# Patient Record
Sex: FEMALE | Race: BLACK OR AFRICAN AMERICAN | NOT HISPANIC OR LATINO | URBAN - METROPOLITAN AREA
[De-identification: names, ages, dates, MRNs, and addresses within clinical notes are randomized per-mention and may not be internally consistent; named-entity substitution may affect disease eponyms.]

---

## 2018-08-14 ENCOUNTER — APPOINTMENT (OUTPATIENT)
Age: 33
Setting detail: DERMATOLOGY
End: 2018-08-15

## 2018-08-14 DIAGNOSIS — L20.89 OTHER ATOPIC DERMATITIS: ICD-10-CM

## 2018-08-14 PROCEDURE — OTHER COUNSELING: OTHER

## 2018-08-14 PROCEDURE — OTHER PRESCRIPTION: OTHER

## 2018-08-14 PROCEDURE — 99202 OFFICE O/P NEW SF 15 MIN: CPT

## 2018-08-14 RX ORDER — FLUOCINONIDE 0.5 MG/ML
OINTMENT TOPICAL
Qty: 1 | Refills: 0 | Status: ERX

## 2018-08-14 ASSESSMENT — LOCATION SIMPLE DESCRIPTION DERM
LOCATION SIMPLE: RIGHT ANTERIOR NECK
LOCATION SIMPLE: LEFT FOREARM
LOCATION SIMPLE: CHEST

## 2018-08-14 ASSESSMENT — LOCATION DETAILED DESCRIPTION DERM
LOCATION DETAILED: RIGHT INFERIOR ANTERIOR NECK
LOCATION DETAILED: UPPER STERNUM
LOCATION DETAILED: LEFT VENTRAL PROXIMAL FOREARM

## 2018-08-14 ASSESSMENT — LOCATION ZONE DERM
LOCATION ZONE: ARM
LOCATION ZONE: NECK
LOCATION ZONE: TRUNK

## 2020-08-07 ENCOUNTER — APPOINTMENT (OUTPATIENT)
Dept: URBAN - METROPOLITAN AREA CLINIC 285 | Age: 35
Setting detail: DERMATOLOGY
End: 2020-08-10

## 2020-08-07 DIAGNOSIS — L20.89 OTHER ATOPIC DERMATITIS: ICD-10-CM

## 2020-08-07 DIAGNOSIS — L29.8 OTHER PRURITUS: ICD-10-CM

## 2020-08-07 PROCEDURE — OTHER PATIENT SPECIFIC COUNSELING: OTHER

## 2020-08-07 PROCEDURE — OTHER TREATMENT REGIMEN: OTHER

## 2020-08-07 PROCEDURE — OTHER PRESCRIPTION: OTHER

## 2020-08-07 PROCEDURE — 99214 OFFICE O/P EST MOD 30 MIN: CPT

## 2020-08-07 PROCEDURE — OTHER COUNSELING: OTHER

## 2020-08-07 RX ORDER — HYDROXYZINE HYDROCHLORIDE 10 MG/1
TABLET, FILM COATED ORAL
Qty: 30 | Refills: 0 | Status: ERX | COMMUNITY
Start: 2020-08-07

## 2020-08-07 RX ORDER — DESONIDE 0.5 MG/G
OINTMENT TOPICAL
Qty: 1 | Refills: 0 | Status: ERX | COMMUNITY
Start: 2020-08-07

## 2020-08-07 ASSESSMENT — LOCATION DETAILED DESCRIPTION DERM
LOCATION DETAILED: RIGHT SUPERIOR HELIX
LOCATION DETAILED: RIGHT VENTRAL DISTAL FOREARM
LOCATION DETAILED: RIGHT VENTRAL PROXIMAL FOREARM
LOCATION DETAILED: LEFT ANTECUBITAL SKIN
LOCATION DETAILED: LEFT SUPERIOR MEDIAL MALAR CHEEK
LOCATION DETAILED: RIGHT SUPERIOR MEDIAL MALAR CHEEK

## 2020-08-07 ASSESSMENT — LOCATION SIMPLE DESCRIPTION DERM
LOCATION SIMPLE: RIGHT CHEEK
LOCATION SIMPLE: LEFT UPPER ARM
LOCATION SIMPLE: RIGHT FOREARM
LOCATION SIMPLE: RIGHT EAR
LOCATION SIMPLE: LEFT CHEEK

## 2020-08-07 ASSESSMENT — LOCATION ZONE DERM
LOCATION ZONE: ARM
LOCATION ZONE: EAR
LOCATION ZONE: FACE

## 2020-08-07 NOTE — HPI: RASH
How Severe Is Your Rash?: moderate
Is This A New Presentation, Or A Follow-Up?: Rash
Additional History: Patient states that she has tried cortisone 10, anti itch cream, and then her sons hydrocortisone ointment but didn’t help. Patient states that she went to urgent care yesterday and was given a steroid shot and prednisone to take.

## 2020-08-07 NOTE — PROCEDURE: TREATMENT REGIMEN
Plan: If needed may use otc Allegra during the day for itching\\n\\nPatient recommended to f/u in [place] in two to three weeks since she will be moving there.
Detail Level: Zone
Initiate Treatment: Hydroxyzine (see AD impression)
Continue Regimen: Prednisone as directed by urgent care doctor
Initiate Treatment: Desonide ointment bid to arms for two weeks then decrease to once a day for one week then d/c. Small amount once daily to under eyes for 1 week then d/c.\\nHydroxyzine 10mg, take one tab once nightly as needed for itching

## 2021-02-02 ENCOUNTER — OFFICE VISIT (OUTPATIENT)
Dept: PRIMARY CARE CLINIC | Age: 36
End: 2021-02-02
Payer: COMMERCIAL

## 2021-02-02 VITALS
DIASTOLIC BLOOD PRESSURE: 82 MMHG | WEIGHT: 112 LBS | HEART RATE: 73 BPM | OXYGEN SATURATION: 100 % | SYSTOLIC BLOOD PRESSURE: 124 MMHG | BODY MASS INDEX: 21.14 KG/M2 | TEMPERATURE: 97.7 F | HEIGHT: 61 IN

## 2021-02-02 DIAGNOSIS — Z13.1 SCREENING FOR DIABETES MELLITUS: ICD-10-CM

## 2021-02-02 DIAGNOSIS — Z13.220 SCREENING FOR LIPID DISORDERS: ICD-10-CM

## 2021-02-02 DIAGNOSIS — Z13.0 SCREENING FOR DEFICIENCY ANEMIA: ICD-10-CM

## 2021-02-02 DIAGNOSIS — R07.9 CHEST PAIN, UNSPECIFIED TYPE: ICD-10-CM

## 2021-02-02 DIAGNOSIS — Z13.29 SCREENING FOR THYROID DISORDER: ICD-10-CM

## 2021-02-02 DIAGNOSIS — R21 RASH AND NONSPECIFIC SKIN ERUPTION: Primary | ICD-10-CM

## 2021-02-02 DIAGNOSIS — R23.9 RECENT SKIN CHANGES: ICD-10-CM

## 2021-02-02 PROCEDURE — 99202 OFFICE O/P NEW SF 15 MIN: CPT | Performed by: NURSE PRACTITIONER

## 2021-02-02 RX ORDER — NYSTATIN 100000 [USP'U]/G
POWDER TOPICAL
Qty: 30 G | Refills: 2 | Status: SHIPPED | OUTPATIENT
Start: 2021-02-02 | End: 2021-05-21 | Stop reason: ALTCHOICE

## 2021-02-02 SDOH — ECONOMIC STABILITY: FOOD INSECURITY: WITHIN THE PAST 12 MONTHS, THE FOOD YOU BOUGHT JUST DIDN'T LAST AND YOU DIDN'T HAVE MONEY TO GET MORE.: NOT ASKED

## 2021-02-02 SDOH — ECONOMIC STABILITY: FOOD INSECURITY: WITHIN THE PAST 12 MONTHS, YOU WORRIED THAT YOUR FOOD WOULD RUN OUT BEFORE YOU GOT MONEY TO BUY MORE.: NOT ASKED

## 2021-02-02 SDOH — ECONOMIC STABILITY: INCOME INSECURITY: HOW HARD IS IT FOR YOU TO PAY FOR THE VERY BASICS LIKE FOOD, HOUSING, MEDICAL CARE, AND HEATING?: NOT ASKED

## 2021-02-02 SDOH — ECONOMIC STABILITY: TRANSPORTATION INSECURITY
IN THE PAST 12 MONTHS, HAS THE LACK OF TRANSPORTATION KEPT YOU FROM MEDICAL APPOINTMENTS OR FROM GETTING MEDICATIONS?: NOT ASKED

## 2021-02-02 ASSESSMENT — ENCOUNTER SYMPTOMS
ABDOMINAL PAIN: 0
DIARRHEA: 0
CONSTIPATION: 0
NAUSEA: 0
BLOOD IN STOOL: 0
SHORTNESS OF BREATH: 0
RECTAL PAIN: 0
ANAL BLEEDING: 0
VOMITING: 0
CHEST TIGHTNESS: 0

## 2021-02-02 ASSESSMENT — PATIENT HEALTH QUESTIONNAIRE - PHQ9
1. LITTLE INTEREST OR PLEASURE IN DOING THINGS: 0
SUM OF ALL RESPONSES TO PHQ QUESTIONS 1-9: 0
SUM OF ALL RESPONSES TO PHQ9 QUESTIONS 1 & 2: 0
2. FEELING DOWN, DEPRESSED OR HOPELESS: 0
1. LITTLE INTEREST OR PLEASURE IN DOING THINGS: 0
SUM OF ALL RESPONSES TO PHQ9 QUESTIONS 1 & 2: 0
SUM OF ALL RESPONSES TO PHQ QUESTIONS 1-9: 0
SUM OF ALL RESPONSES TO PHQ QUESTIONS 1-9: 0

## 2021-02-02 NOTE — LETTER
[facility]  [address] [id number]  Phone: [phone]  Fax: [phone]    Myriam KingstonCONNIE CNP                                                                                   February 2, 2021                       [facility]  [address] [address]      Dear Debi Naidu: Thank you for enrolling in [address] Please follow the instructions below to securely access your online medical record. [employer] allows you to send messages to your doctor, view your test results, renew your prescriptions, schedule appointments, and more. How Do I Sign Up? 1. In your Internet browser, go to [email or web address]  2. Click on the Sign Up Now link in the Sign In box. You will see the New Member Sign Up page. 3. Enter your [employer] Access Code exactly as it appears below. You will not need to use this code after youve completed the sign-up process. If you do not sign up before the expiration date, you must request a new code. [employer] Access Code: [email or web address]  Expires: 3/19/2021  5:49 PM    4. Enter your Social Security Number ([id number]) and Date of Birth (mm/dd/yyyy) as indicated and click Submit. You will be taken to the next sign-up page. 5. Create a [employer] ID. This will be your [employer] login ID and cannot be changed, so think of one that is secure and easy to remember. 6. Create a [employer] password. You can change your password at any time. 7. Enter your Password Reset Question and Answer. This can be used at a later time if you forget your password. 8. Enter your e-mail address. You will receive e-mail notification when new information is available in [address] 9. Click Sign Up. You can now view your medical record. Additional Information  If you have questions, please contact the physician practice where you receive care. Remember, [employer] is NOT to be used for urgent needs. For medical emergencies, dial 911. For questions regarding your [employer] account call [phone]. If you have a clinical question, please call your doctor's office.     Sincerely,  CONNIE Freitas CNP

## 2021-02-02 NOTE — PROGRESS NOTES
[address] [facility]  [address]  [address] [facility] [place] [address]  Dept: [phone]       2021    Shiv Gant (:  1985) eron 37 y.o. female, here to establish care and receive preventative care services. States she relocated here in August. She reports intermittent right chest wall pain, several new skin changes. Chest Pain   This is a new problem. The current episode started 1 to 4 weeks ago. The problem occurs intermittently. The pain is mild. The pain does not radiate. Pertinent negatives include no abdominal pain, dizziness, fever, headaches, malaise/fatigue, nausea, palpitations, shortness of breath or vomiting. The pain is aggravated by nothing. She has tried nothing for the symptoms. reports right sided pain with deep breaths or laughing for 2 weeks. headaches have occurred at the time of chest pain but has also occurred independently and not with every occurrence of chest pain. Does not take any supplements  Exercise: does not exercise regularly    Headache  Reports the headache as stabbing, lasting seconds and resolves without intervention. States the headaches cause her to stop activity, close her eyes until it passes. States she is not aware of any visual disturbance because she closes her eyes with the pain. Does not identify any other symptoms with the headache other then occurring with the chest pain. Works 5 days/week outside the home,  was recruited to [employer].  with 2 children. Health Care Maintenance  Influenza vaccine: declined  Cervical Cancer Screen: UTD     Review of Systems   Constitutional: Negative for activity change, fever and malaise/fatigue. HENT: Negative for congestion. Eyes: Negative for visual disturbance. Respiratory: Negative for chest tightness and shortness of breath. Cardiovascular: Positive for chest pain. Negative for palpitations and leg swelling.    Gastrointestinal: Negative for abdominal pain, anal bleeding, blood in stool, constipation, diarrhea, nausea, rectal pain and vomiting. Endocrine: Negative for polyuria. Genitourinary: Negative for dysuria. Musculoskeletal: Negative for arthralgias and myalgias. Skin: Positive for rash (under right breast). Scab on abdomen  Bump on right chest wall  Scratch behind ear   Neurological: Negative for dizziness, light-headedness and headaches. Psychiatric/Behavioral: Negative for agitation, decreased concentration and sleep disturbance. The patient is not nervous/anxious. Prior to Visit Medications    Medication Sig Taking? Authorizing Provider   nystatin (MYCOSTATIN) [id number] UNIT/GM powder Apply 3 times daily. Yes Erskin Cram, APRN - CNP       Allergies   Allergen Reactions    Penicillins        History reviewed. No pertinent past medical history.     Past Surgical History:   Procedure Laterality Date     SECTION         Social History     Socioeconomic History    Marital status:      Spouse name: Not on file    Number of children: 2    Years of education: Not on file    Highest education level: Not on file   Occupational History    Occupation: Training    Social Needs    Financial resource strain: Not on file    Food insecurity     Worry: Not on file     Inability: Not on file   Daojia needs     Medical: Not on file     Non-medical: Not on file   Tobacco Use    Smoking status: Never Smoker    Smokeless tobacco: Never Used   Substance and Sexual Activity    Alcohol use: Never     Frequency: Never    Drug use: Never    Sexual activity: Yes     Partners: Male     Comment: One male partner   Lifestyle    Physical activity     Days per week: Not on file     Minutes per session: Not on file    Stress: Not on file   Relationships    Social connections     Talks on phone: Not on file     Gets together: Not on file     Attends Buddhism service: Not on file     Active member of club or organization: Not on file     Attends meetings of clubs or organizations: Not on file     Relationship status: Not on file    Intimate partner violence     Fear of current or ex partner: Not on file     Emotionally abused: Not on file     Physically abused: Not on file     Forced sexual activity: Not on file   Other Topics Concern    Not on file   Social History Narrative    Lives with spouse, 2 children and mother        Family History   Problem Relation Age of Onset    Breast Cancer Mother     High Blood Pressure Mother     Schizophrenia Father     Diabetes Father     Other Sister         compromised immune system    Asthma Sister     Cancer Maternal Grandfather         colon    Cancer Paternal Grandmother         Lung    Cancer Maternal Uncle         bone       Vitals:    02/02/21 1643   BP: 124/82   Pulse: 73   Temp: 97.7 °F (36.5 °C)   TempSrc: Temporal   SpO2: 100%   Weight: 112 lb (50.8 kg)   Height: 5' 1\" (1.549 m)     Estimated body mass index is 21.16 kg/m² as calculated from the following:    Height as of this encounter: 5' 1\" (1.549 m). Weight as of this encounter: 112 lb (50.8 kg). Physical Exam  Vitals signs reviewed. Constitutional:       Appearance: She is well-developed. HENT:      Head: Normocephalic. Right Ear: Hearing and external ear normal.      Left Ear: Hearing and external ear normal.      Nose: Nose normal.   Eyes:      General: Lids are normal.   Cardiovascular:      Rate and Rhythm: Normal rate and regular rhythm. Heart sounds: Normal heart sounds, S1 normal and S2 normal.   Pulmonary:      Effort: Pulmonary effort is normal.      Breath sounds: Normal breath sounds. Chest:       Abdominal:       Musculoskeletal: Normal range of motion. Skin:     General: Skin is warm and dry. Findings: No rash. Neurological:      Mental Status: She is alert and oriented to person, place, and time. GCS: GCS eye subscore is 4.  GCS verbal subscore is 5. GCS motor subscore is 6. Psychiatric:         Speech: Speech normal.         Behavior: Behavior normal. Behavior is cooperative. ASSESSMENT/PLAN:  1. Screening for thyroid disorder    - TSH with Reflex; Future    2. Screening for deficiency anemia    - CBC Auto Differential; Future    3. Screening for diabetes mellitus    - Comprehensive Metabolic Panel; Future    4. Screening for lipid disorders    - Lipid Panel; Future    5. Rash and nonspecific skin eruption  - Lupus vs insect bite vs fungal infection  - OFE; Future  - SEDIMENTATION RATE; Future  -Offers no complaints of joint pain  -Apply powder as prescribed  -Will consider Clotrimazole if Nystatin powder ineffective    6. Recent skin changes   Lupus vs Autoimmune disease vs contact dermatitis  -Rashes do not itch.   -Skin changes are recent, changes in color and size in short period of time. - OFE; Future  - SEDIMENTATION RATE; Future  -No family history of skin cancers  -No Dermatologist in [address] [place] [address] are accepting new patients  -Advised to call insurance company for directory of Dermatologist in the area. 7. Chest pain, unspecified type  - Musculoskeletal pain  -No family history of cardiac disease   -Pain is on the right side, oxygen levels within normal limits  -No known history of Covid or exposure      Return in about 3 months (around 5/2/2021). Reviewed patient's pertinent medical history, relevant laboratory results, imaging studies, and health maintenance. Medications have been reviewed and discussed with the patient, refills otherwise up-to-date. Discussed the importance of adhering to current medication regimen. Advised:  (1) continue to work on eating a healthy balanced diet; (2) stay active by exercising within your personal limits.  Patient was advised to keep future appointments with their respective specialty care team(s). Questions and concerns addressed, care plan reviewed and patient is agreeable with the care plan following today's visit.         --CONNIE Concepcion - CNP on 2/3/2021 at 3:03 PM

## 2021-02-08 ENCOUNTER — TELEPHONE (OUTPATIENT)
Dept: PRIMARY CARE CLINIC | Age: 36
End: 2021-02-08

## 2021-02-08 DIAGNOSIS — R21 RASH AND NONSPECIFIC SKIN ERUPTION: ICD-10-CM

## 2021-02-08 DIAGNOSIS — R23.9 RECENT SKIN CHANGES: Primary | ICD-10-CM

## 2021-02-08 NOTE — TELEPHONE ENCOUNTER
----- Message from Farhanmandi Jaqueline sent at 2/8/2021  9:04 AM EST -----  Subject: Referral Request    QUESTIONS   Reason for referral request? Patient is requesting a Referral for   Dermatologist   Has the physician seen you for this condition before? Yes  Select a date? 2021-02-02  Select the physician (PCP or Specialist)? Freya Villa   Preferred Specialist (if applicable)? Do you already have an appointment scheduled? Yes  Select Scheduled Date? 2021-02-10  Select Scheduled Physician? Outside Physician Km Palma  Additional Information for Provider? Fax Number [phone]   ---------------------------------------------------------------------------  --------------  Ronnie Marion INFO  What is the best way for the office to contact you? OK to leave message on   voicemail  Preferred Call Back Phone Number?  [phone]

## 2021-05-20 ENCOUNTER — NURSE TRIAGE (OUTPATIENT)
Dept: OTHER | Facility: CLINIC | Age: 36
End: 2021-05-20

## 2021-05-20 NOTE — PROGRESS NOTES
Co-worker recently had a MVC, syncopal episode and she had chronic headaches. Patient does not want to start any medications, have CT scan, or see Neurology at this time. She wants to continue with life style changes. States she has stopped eating sweets, increased vegetable intake. Works 5 days/week outside the home, states was recruited to [employer].       with 2 children. Dry Mouth  Reports she has dry mouth in the morning. States her mouth feels dry throughout the day. Has not established a dentist since relocating to [place]. States she grinds her teeth, but does not snore. Has changed diet, decreasing sugar and increasing water intake. Denies allergies. Denies oral ulcerations, difficulty swallowing, dental caries. Lab Results   Component Value Date    CHOL 152 02/06/2021     Lab Results   Component Value Date    TRIG 54 02/06/2021     Lab Results   Component Value Date    HDL 56 02/06/2021     Lab Results   Component Value Date    LDLCALC 85 02/06/2021     Lab Results   Component Value Date    LABVLDL 11 02/06/2021     No results found for: [facility]  Lab Results   Component Value Date    WBC 3.2 (L) 02/06/2021    HGB 12.9 02/06/2021    HCT 39.8 02/06/2021    MCV 92.7 02/06/2021     02/06/2021     Lab Results   Component Value Date     02/06/2021    K 3.7 02/06/2021     02/06/2021    CO2 26 02/06/2021    BUN 7 02/06/2021    CREATININE 0.7 02/06/2021    GLUCOSE 81 02/06/2021    CALCIUM 9.7 02/06/2021    PROT 6.4 02/06/2021    LABALBU 4.3 02/06/2021    BILITOT 0.4 02/06/2021    ALKPHOS 41 02/06/2021    AST 20 02/06/2021    ALT 21 02/06/2021    LABGLOM >60 02/06/2021    GFRAA >60 02/06/2021    AGRATIO 2.0 02/06/2021    GLOB 2.1 02/06/2021       Review of Systems   Constitutional: Negative for activity change and fever. HENT: Negative for congestion, postnasal drip, sore throat, trouble swallowing and voice change.          Dry mouth, grinds teeth   Eyes: Negative for mg)  B-Complex supplement  - CT HEAD W WO CONTRAST; Future    2. Dizziness    - CT HEAD W WO CONTRAST; Future    3. Bilateral arm weakness    - CT HEAD W WO CONTRAST; Future    4. Bilateral leg weakness    - CT HEAD W WO CONTRAST; Future    5. Dry mouth  -Schedule dentist appointment for evaluation      Return in about 4 weeks (around 6/18/2021). Reviewed patient's pertinent medical history, relevant laboratory results, imaging studies, and health maintenance. Medications have been reviewed and discussed with the patient, refills otherwise up-to-date. Discussed the importance of adhering to current medication regimen. Advised:  (1) continue to work on eating a healthy balanced diet; (2) stay active by exercising within your personal limits. Patient was advised to keep future appointments with their respective specialty care team(s). Questions and concerns addressed, care plan reviewed and patient is agreeable with the care plan following today's visit. Chelsey Lucero, was evaluated through a synchronous (real-time) audio-video encounter. The patient (or guardian if applicable) is aware that this is a billable service. Verbal consent to proceed has been obtained within the past 12 months. The visit was conducted pursuant to the emergency declaration under the [facility], [address] [place] [address] authority and the [employer] General Act. Patient identification was verified, and a caregiver was present when appropriate. The patient was located in a state where the provider was credentialed to provide care. Total time spent for this encounter: Not billed by time    --CONNIE Ramirez CNP on 5/21/2021 at 11:58 AM    An electronic signature was used to authenticate this note.         --CONNIE Ramirez CNP on 5/21/2021 at 11:56 AM

## 2021-05-20 NOTE — TELEPHONE ENCOUNTER
Reason for Disposition   Unexplained headache that is present > 24 hours    Answer Assessment - Initial Assessment Questions  1. LOCATION: \"Where does it hurt? \"       Behind her eyes and at the back of her head    2. ONSET: \"When did the headache start? \" (Minutes, hours or days)     Headaches about two weeks ago  Dry mouth has been going on for a long time  Dizziness and lightheadedness with some weakness began 5/14/21. Pt reports she needs to lay down to rest and recover from the symptoms    3. PATTERN: \"Does the pain come and go, or has it been constant since it started? \"     Dizziness and headaches are becoming worse when they occur. Lately there hasn't been a time where she has felt 100%      4. SEVERITY: \"How bad is the pain? \" and \"What does it keep you from doing? \"  (e.g., Scale 1-10; mild, moderate, or severe)    - MILD (1-3): doesn't interfere with normal activities     - MODERATE (4-7): interferes with normal activities or awakens from sleep     - SEVERE (8-10): excruciating pain, unable to do any normal activities       Dull pressure more than pain   Nausea goes along with dizziness and lightheadedness and rates that as a six. 5. RECURRENT SYMPTOM: \"Have you ever had headaches before? \" If so, ask: \"When was the last time? \" and \"What happened that time? \"   Dry mouth has been ongoing for a long time. Other symptoms have not occurred together. 6. CAUSE: \"What do you think is causing the headache? \"    Pt reports that she has had some diet changes. 7. MIGRAINE: \"Have you been diagnosed with migraine headaches? \" If so, ask: \"Is this headache similar? \"    not had that diagnosis -- pt doesn't think these are migraines    8. HEAD INJURY: \"Has there been any recent injury to the head? \"     None    9. OTHER SYMPTOMS: \"Do you have any other symptoms? \" (fever, stiff neck, eye pain, sore throat, cold symptoms)  none    10. PREGNANCY: \"Is there any chance you are pregnant? \" \"When was your last menstrual period? \"     no    Protocols used: HEADACHE-ADULT-[place]    Received call from Mitchell Knox at pre-service center [facility]) [place] with Red Flag Complaint. Brief description of triage: headache off and on for 2 weeks, seems to be getting worse; will get dizziness, tired & nausea at times. Triage indicates for patient to be seen by Provider today or tomorrow. Care advice provided, patient verbalizes understanding; denies any other questions or concerns; instructed to call back for any new or worsening symptoms. Writer provided warm transfer to [facility] for appointment scheduling. Attention Provider: Thank you for allowing me to participate in the care of your patient. The patient was connected to triage in response to information provided to the ECC. Please do not respond through this encounter as the response is not directed to a shared pool.

## 2021-05-21 ENCOUNTER — VIRTUAL VISIT (OUTPATIENT)
Dept: PRIMARY CARE CLINIC | Age: 36
End: 2021-05-21
Payer: COMMERCIAL

## 2021-05-21 DIAGNOSIS — R68.2 DRY MOUTH: ICD-10-CM

## 2021-05-21 DIAGNOSIS — R42 DIZZINESS: ICD-10-CM

## 2021-05-21 DIAGNOSIS — R29.898 BILATERAL LEG WEAKNESS: ICD-10-CM

## 2021-05-21 DIAGNOSIS — R29.898 BILATERAL ARM WEAKNESS: ICD-10-CM

## 2021-05-21 DIAGNOSIS — R51.9 NEW ONSET HEADACHE: Primary | ICD-10-CM

## 2021-05-21 PROCEDURE — 1036F TOBACCO NON-USER: CPT | Performed by: NURSE PRACTITIONER

## 2021-05-21 PROCEDURE — G8420 CALC BMI NORM PARAMETERS: HCPCS | Performed by: NURSE PRACTITIONER

## 2021-05-21 PROCEDURE — 99214 OFFICE O/P EST MOD 30 MIN: CPT | Performed by: NURSE PRACTITIONER

## 2021-05-21 PROCEDURE — G8427 DOCREV CUR MEDS BY ELIG CLIN: HCPCS | Performed by: NURSE PRACTITIONER

## 2021-05-21 ASSESSMENT — VISUAL ACUITY: OU: 1

## 2021-05-21 ASSESSMENT — ENCOUNTER SYMPTOMS
EYE DISCHARGE: 0
TROUBLE SWALLOWING: 0
SORE THROAT: 0
EYE REDNESS: 0
SHORTNESS OF BREATH: 0
ABDOMINAL PAIN: 0
NAUSEA: 1
CHEST TIGHTNESS: 0
CONSTIPATION: 0
VOICE CHANGE: 0
DIARRHEA: 0

## 2021-06-28 ENCOUNTER — HOSPITAL ENCOUNTER (OUTPATIENT)
Dept: CT IMAGING | Age: 36
Discharge: HOME | End: 2021-06-28
Payer: COMMERCIAL

## 2021-06-28 DIAGNOSIS — R29.898 BILATERAL LEG WEAKNESS: ICD-10-CM

## 2021-06-28 DIAGNOSIS — R29.898 BILATERAL ARM WEAKNESS: ICD-10-CM

## 2021-06-28 DIAGNOSIS — R42 DIZZINESS: ICD-10-CM

## 2021-06-28 DIAGNOSIS — R51.9 NEW ONSET HEADACHE: ICD-10-CM

## 2021-06-28 PROCEDURE — 6360000004 HC RX CONTRAST MEDICATION: Performed by: NURSE PRACTITIONER

## 2021-06-28 PROCEDURE — 70470 CT HEAD/BRAIN W/O & W/DYE: CPT

## 2021-06-28 RX ADMIN — IOPAMIDOL 80 ML: 755 INJECTION, SOLUTION INTRAVENOUS at 08:15

## 2021-09-17 ENCOUNTER — OFFICE VISIT (OUTPATIENT)
Dept: PRIMARY CARE CLINIC | Age: 36
End: 2021-09-17
Payer: COMMERCIAL

## 2021-09-17 VITALS — OXYGEN SATURATION: 97 % | HEART RATE: 74 BPM | SYSTOLIC BLOOD PRESSURE: 98 MMHG | DIASTOLIC BLOOD PRESSURE: 64 MMHG

## 2021-09-17 DIAGNOSIS — M54.50 ACUTE RIGHT-SIDED LOW BACK PAIN WITHOUT SCIATICA: Primary | ICD-10-CM

## 2021-09-17 LAB
BILIRUBIN, POC: NEGATIVE
BLOOD URINE, POC: NEGATIVE
CLARITY, POC: CLEAR
COLOR, POC: YELLOW
GLUCOSE URINE, POC: NEGATIVE
KETONES, POC: NEGATIVE
LEUKOCYTE EST, POC: NEGATIVE
NITRITE, POC: NEGATIVE
PH, POC: 7.5
PROTEIN, POC: NEGATIVE
SPECIFIC GRAVITY, POC: 1
UROBILINOGEN, POC: 0.2

## 2021-09-17 PROCEDURE — 1036F TOBACCO NON-USER: CPT | Performed by: FAMILY MEDICINE

## 2021-09-17 PROCEDURE — 99212 OFFICE O/P EST SF 10 MIN: CPT | Performed by: FAMILY MEDICINE

## 2021-09-17 PROCEDURE — G8427 DOCREV CUR MEDS BY ELIG CLIN: HCPCS | Performed by: FAMILY MEDICINE

## 2021-09-17 PROCEDURE — G8420 CALC BMI NORM PARAMETERS: HCPCS | Performed by: FAMILY MEDICINE

## 2021-09-17 PROCEDURE — 81002 URINALYSIS NONAUTO W/O SCOPE: CPT | Performed by: FAMILY MEDICINE

## 2021-09-17 RX ORDER — NAPROXEN 500 MG/1
500 TABLET ORAL 2 TIMES DAILY WITH MEALS
Qty: 30 TABLET | Refills: 1 | Status: SHIPPED | OUTPATIENT
Start: 2021-09-17 | End: 2021-10-25

## 2021-09-17 RX ORDER — CYCLOBENZAPRINE HCL 5 MG
5 TABLET ORAL 2 TIMES DAILY PRN
Qty: 20 TABLET | Refills: 1 | Status: SHIPPED | OUTPATIENT
Start: 2021-09-17 | End: 2022-11-03 | Stop reason: SDUPTHER

## 2021-09-17 ASSESSMENT — ENCOUNTER SYMPTOMS
DIARRHEA: 0
ABDOMINAL PAIN: 0
CONSTIPATION: 0
SHORTNESS OF BREATH: 0
BLOOD IN STOOL: 0

## 2021-09-17 NOTE — PROGRESS NOTES
(1.549 m). Weight as of 2/2/21: 112 lb (50.8 kg). Physical Exam  Constitutional:       General: She is not in acute distress. Appearance: She is well-developed. HENT:      Head: Normocephalic. Right Ear: External ear normal.      Nose: Nose normal.   Eyes:      Conjunctiva/sclera: Conjunctivae normal.      Pupils: Pupils are equal, round, and reactive to light. Neck:      Thyroid: No thyromegaly. Cardiovascular:      Rate and Rhythm: Normal rate and regular rhythm. Heart sounds: Normal heart sounds. No murmur heard. No friction rub. Pulmonary:      Effort: Pulmonary effort is normal. No respiratory distress. Breath sounds: Normal breath sounds. No wheezing or rales. Abdominal:      General: Bowel sounds are normal. There is no distension. Palpations: Abdomen is soft. There is no mass. Musculoskeletal:         General: Normal range of motion. Cervical back: Normal range of motion and neck supple. Lymphadenopathy:      Cervical: No cervical adenopathy. Skin:     General: Skin is warm. Findings: No rash. Neurological:      Mental Status: She is alert and oriented to person, place, and time. Psychiatric:         Behavior: Behavior normal.         ASSESSMENT/PLAN:  1. Acute right-sided low back pain without sciatica  Appears to be musculoskeletal in nature. Will prescribe naproxen 500 mg twice daily and Flexeril 5 mg twice daily as needed. Her urinalysis is negative.   If not better in 1 to 2 weeks will order abdominal ultrasound  - POCT Urinalysis no Micro      RTC PRN    An electronic signature was used to authenticate this note.    --Bertrand Lazcano MD on 9/17/2021 at 3:33 PM

## 2021-09-25 NOTE — PROGRESS NOTES
Alexi Moy (:  1985) is a 31 y.o. female,Established patient, here for evaluation of the following chief complaint(s): Other (annual physical)  Patient schedule PAP exam through My Chart, not realizing this Provider no longer preforms PAP's. ASSESSMENT/PLAN:  1. Annual physical exam  Comments:  have labs drawn within 1 week  2. Screening for HIV (human immunodeficiency virus)  -     HIV Screen; Future  3. Need for hepatitis C screening test  -     Hepatitis C Antibody; Future  4. Screening for deficiency anemia  -     CBC Auto Differential; Future  5. Screening for cervical cancer  -     AFL - Sil Pinon MD, Gynecology, [facility]  6. Aching headache        Healthcare maintenance:  Flu vaccine: declined  Covid vaccine: UTD  Varicella Vaccine: reports h/o chicken pox  No follow-ups on file. Subjective   SUBJECTIVE/OBJECTIVE:  HPI  Annual Exam-Premenopausal: Patient presents for annual exam. The patient has no complaints today. The patient is sexually active. The patient wears seatbelts: yes. last pap: was normal  The patient has regular exercise: yes. The patient has ever been transfused or tattooed?: not asked. The patient reports that domestic violence in her life is absent.      Had sharp ice pick like headaches in the past. These headaches are different, and occurs 75 % of the time. Associates leg weakness with headache. Has not taken any medication for relief, \" I don't like taking pills. \" Her aunt has migraines. She reports leg and arm weakness, started 1 week ago, weakness is worse at night, improves as the day progresses. Has stopped walking, to determine if it is caused by exercise, but the symptoms have not improve.    Patient states her spouse and co-workers are concerned about her headaches. Co-worker recently had a MVC, syncopal episode and she had chronic headaches. Patient does not want to start any medications, have CT scan, or see Neurology at this time.  She wants to continue with life style changes. States she has stopped eating sweets, increased vegetable intake. She reports her headaches had improved until the stress at work increased.      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       with 2 children, 10 yo and 4 yo. Has new contact prescription, after eye exam 3 months ago, which has caused dry eyes. Plan to seek care from another Specialist for second opinion.      Dry Mouth  Reports she has dry mouth in the morning. States her mouth feels dry throughout the day. Has not established a dentist since relocating to [place]. States she grinds her teeth, but does not snore. Has changed diet, decreasing sugar and increasing water intake. Denies allergies. Denies oral ulcerations, difficulty swallowing, dental caries.     She has several family members with multiple types of cancer. Interested in genetic testing.      Review of Systems   Constitutional: Negative for activity change and fever. HENT: Negative for congestion, postnasal drip, sore throat, trouble swallowing and voice change. Dry mouth, grinds teeth   Eyes: Negative for discharge, redness and visual disturbance. Respiratory: Negative for chest tightness and shortness of breath. Cardiovascular: Negative for chest pain, palpitations and leg swelling. Gastrointestinal: Negative for abdominal pain, constipation, diarrhea and nausea. Endocrine: Negative for polyuria. Genitourinary: Negative for dysuria. Musculoskeletal: Negative for arthralgias and myalgias. Skin: Negative for rash. Allergic/Immunologic: Negative for environmental allergies. Neurological: Positive for headaches. Negative for dizziness, weakness (legs), light-headedness and numbness. Psychiatric/Behavioral: Negative for agitation, decreased concentration and sleep disturbance. The patient is not nervous/anxious.            Objective    No past medical history on file. Past Surgical History:   Procedure Laterality Date     SECTION       Social History     Tobacco Use    Smoking status: Never Smoker    Smokeless tobacco: Never Used   Vaping Use    Vaping Use: Never used   Substance Use Topics    Alcohol use: Never    Drug use: Never     Family History   Problem Relation Age of Onset    Breast Cancer Mother     High Blood Pressure Mother     Other Father         sleep apnea    Schizophrenia Father     Diabetes Father     Other Sister         compromised immune system    Asthma Sister     Heart Attack Maternal Grandmother     Cancer Maternal Grandfather         colon    Cancer Paternal Grandmother         Lung    No Known Problems Paternal Grandfather     Cancer Maternal Uncle         bone    Cancer Maternal Uncle     Cancer Maternal Uncle     Cancer Paternal Aunt         lung cancer, smoker    height is 5' 1\" (1.549 m) and weight is 120 lb (54.4 kg). Her temporal temperature is 97.2 °F (36.2 °C). Her blood pressure is 109/75 and her pulse is 75. Her oxygen saturation is 98%. Physical Exam  Vitals reviewed. Constitutional:       Appearance: She is well-developed. HENT:      Head: Normocephalic. Right Ear: Hearing and external ear normal.      Left Ear: Hearing and external ear normal.      Nose: Nose normal.   Eyes:      General: Lids are normal.   Cardiovascular:      Rate and Rhythm: Normal rate and regular rhythm. Heart sounds: Normal heart sounds, S1 normal and S2 normal.   Pulmonary:      Effort: Pulmonary effort is normal.      Breath sounds: Normal breath sounds. Musculoskeletal:         General: Normal range of motion. Skin:     General: Skin is warm and dry. Findings: No rash. Neurological:      Mental Status: She is alert and oriented to person, place, and time. GCS: GCS eye subscore is 4. GCS verbal subscore is 5. GCS motor subscore is 6.    Psychiatric:         Speech: Speech normal. Behavior: Behavior normal. Behavior is cooperative. On this date 9/28/2021 I have spent 15 minutes reviewing previous notes, test results and face to face with the patient discussing the diagnosis and importance of compliance with the treatment plan as well as documenting on the day of the visit. Reviewed patient's pertinent medical history, relevant laboratory results, imaging studies, and health maintenance. Medications have been reviewed and discussed with the patient, refills otherwise up-to-date. Discussed the importance of adhering to current medication regimen. Advised:  (1) continue to work on eating a healthy balanced diet; (2) stay active by exercising within your personal limits. Patient was advised to keep future appointments with their respective specialty care team(s). Questions and concerns addressed, care plan reviewed and patient is agreeable with the care plan following today's visit. An electronic signature was used to authenticate this note.     --Jose Bender, APRN - CNP

## 2021-09-28 ENCOUNTER — OFFICE VISIT (OUTPATIENT)
Dept: PRIMARY CARE CLINIC | Age: 36
End: 2021-09-28
Payer: COMMERCIAL

## 2021-09-28 VITALS
TEMPERATURE: 97.2 F | WEIGHT: 120 LBS | HEART RATE: 75 BPM | SYSTOLIC BLOOD PRESSURE: 109 MMHG | OXYGEN SATURATION: 98 % | HEIGHT: 61 IN | DIASTOLIC BLOOD PRESSURE: 75 MMHG | BODY MASS INDEX: 22.66 KG/M2

## 2021-09-28 DIAGNOSIS — Z12.4 SCREENING FOR CERVICAL CANCER: ICD-10-CM

## 2021-09-28 DIAGNOSIS — Z00.00 ANNUAL PHYSICAL EXAM: Primary | ICD-10-CM

## 2021-09-28 DIAGNOSIS — Z13.0 SCREENING FOR DEFICIENCY ANEMIA: ICD-10-CM

## 2021-09-28 DIAGNOSIS — R51.9 ACHING HEADACHE: ICD-10-CM

## 2021-09-28 DIAGNOSIS — Z11.59 NEED FOR HEPATITIS C SCREENING TEST: ICD-10-CM

## 2021-09-28 DIAGNOSIS — Z11.4 SCREENING FOR HIV (HUMAN IMMUNODEFICIENCY VIRUS): ICD-10-CM

## 2021-09-28 PROCEDURE — 99395 PREV VISIT EST AGE 18-39: CPT | Performed by: NURSE PRACTITIONER

## 2021-09-28 ASSESSMENT — ENCOUNTER SYMPTOMS
ABDOMINAL PAIN: 0
TROUBLE SWALLOWING: 0
DIARRHEA: 0
CHEST TIGHTNESS: 0
SORE THROAT: 0
CONSTIPATION: 0
EYE DISCHARGE: 0
VOICE CHANGE: 0
SHORTNESS OF BREATH: 0
EYE REDNESS: 0
NAUSEA: 0

## 2021-10-09 DIAGNOSIS — Z80.9 FAMILY HISTORY OF CANCER: Primary | ICD-10-CM

## 2021-10-19 LAB
HPV COMMENT: NORMAL
HPV TYPE 16: NOT DETECTED
HPV TYPE 18: NOT DETECTED
HPVOH (OTHER TYPES): NOT DETECTED

## 2021-10-25 ENCOUNTER — TELEPHONE (OUTPATIENT)
Dept: PRIMARY CARE CLINIC | Age: 36
End: 2021-10-25

## 2021-10-25 ENCOUNTER — VIRTUAL VISIT (OUTPATIENT)
Dept: PRIMARY CARE CLINIC | Age: 36
End: 2021-10-25
Payer: COMMERCIAL

## 2021-10-25 DIAGNOSIS — R05.9 COUGH: Primary | ICD-10-CM

## 2021-10-25 DIAGNOSIS — R51.9 ACHING HEADACHE: ICD-10-CM

## 2021-10-25 DIAGNOSIS — R51.9 ACHING HEADACHE: Primary | ICD-10-CM

## 2021-10-25 PROCEDURE — G8427 DOCREV CUR MEDS BY ELIG CLIN: HCPCS | Performed by: NURSE PRACTITIONER

## 2021-10-25 PROCEDURE — 99213 OFFICE O/P EST LOW 20 MIN: CPT | Performed by: NURSE PRACTITIONER

## 2021-10-25 RX ORDER — FLUTICASONE PROPIONATE 50 MCG
1 SPRAY, SUSPENSION (ML) NASAL DAILY
Qty: 16 G | Refills: 2 | Status: SHIPPED | OUTPATIENT
Start: 2021-10-25 | End: 2022-10-15

## 2021-10-25 RX ORDER — GUAIFENESIN 600 MG/1
600 TABLET, EXTENDED RELEASE ORAL 2 TIMES DAILY
Qty: 30 TABLET | Refills: 0 | Status: SHIPPED | OUTPATIENT
Start: 2021-10-25 | End: 2021-11-09

## 2021-10-25 RX ORDER — ECHINACEA PURPUREA EXTRACT 125 MG
1 TABLET ORAL PRN
Qty: 1 EACH | Refills: 3 | Status: SHIPPED | OUTPATIENT
Start: 2021-10-25 | End: 2022-10-15

## 2021-10-25 ASSESSMENT — ENCOUNTER SYMPTOMS
VOMITING: 0
SHORTNESS OF BREATH: 0
SINUS PAIN: 1
SORE THROAT: 1
DIARRHEA: 0
CHEST TIGHTNESS: 0
RHINORRHEA: 1
COUGH: 1
NAUSEA: 1

## 2021-10-25 NOTE — PROGRESS NOTES
10/25/2021    TELEHEALTH EVALUATION -- Audio/Visual (During CMGLT-85 public health emergency)    HPI:    Margie Smith (:  1985) has requested an audio/video evaluation for the following concern(s):    Patient presents via virtual visit today for cough, sinus pain and postnasal drainage. Symptoms began approximately 3 days ago. She complains of right facial pain and sore throat. She denies any trouble swallowing. Eating and drinking does worsen the pain in her throat. She complains of cough that is productive of yellow sputum, sometimes with streaks of blood. Denies any sourav red blood, lots. She has postnasal drainage that she feels is triggering her cough is the cough is intermittent and typically only when she feels a tickle in the back of her throat. She denies fever however notes she has had some chills. She denies shortness of breath. Denies chest pain. Denies changes to her taste and smell. She did have a brief episode of nausea yesterday without vomiting, this is resolved. Denies diarrhea. Denies chest congestion. Has been eating and drinking normally. She has tried tea without relief. Has not tried any over-the-counter remedies aside from this. Denies sick contacts, however notes many coworkers are out ill - she is unsure if she had contact with them. Has received both doses COVID vaccine. She did have COVID testing completed earlier today, results pending. Review of Systems   Constitutional: Positive for chills. Negative for appetite change and fever. HENT: Positive for postnasal drip, rhinorrhea, sinus pain and sore throat. Respiratory: Positive for cough. Negative for chest tightness and shortness of breath. Cardiovascular: Negative for chest pain. Gastrointestinal: Positive for nausea. Negative for diarrhea and vomiting. Psychiatric/Behavioral: Negative. Prior to Visit Medications    Medication Sig Taking?  Authorizing Provider   fluticasone (FLONASE) 50 MCG/ACT nasal spray 1 spray by Each Nostril route daily Yes CONNIE West   guaiFENesin (MUCINEX) 600 MG extended release tablet Take 1 tablet by mouth 2 times daily for 15 days Yes CONNIE West   sodium chloride (OCEAN) 0.65 % nasal spray 1 spray by Nasal route as needed for Congestion (nasal dryness) Yes CONNIE West   Multiple Vitamin (MULTIVITAMIN PO) Take by mouth  Historical Provider, MD       Social History     Tobacco Use    Smoking status: Never Smoker    Smokeless tobacco: Never Used   Vaping Use    Vaping Use: Never used   Substance Use Topics    Alcohol use: Never    Drug use: Never        Allergies   Allergen Reactions    Penicillins    , No past medical history on file. PHYSICAL EXAMINATION:  [ INSTRUCTIONS:  \"[x]\" Indicates a positive item  \"[]\" Indicates a negative item  -- DELETE ALL ITEMS NOT EXAMINED]  Vital Signs: (As obtained by patient/caregiver or practitioner observation)    Constitutional: [x] Appears well-developed and well-nourished [] No apparent distress      [] Abnormal-   Mental status  [x] Alert and awake  [x] Oriented to person/place/time [x]Able to follow commands      Eyes:  EOM    []  Normal  [] Abnormal-  Sclera  []  Normal  [] Abnormal -         Discharge []  None visible  [] Abnormal -    HENT:   [x] Normocephalic, atraumatic. [] Abnormal   [] Mouth/Throat: Mucous membranes are moist.     External Ears [x] Normal  [] Abnormal-     Neck: [x] No visualized mass     Pulmonary/Chest: [x] Respiratory effort normal.  [x] No visualized signs of difficulty breathing or respiratory distress        [] Abnormal- Speaking in complete sentences without difficulty. No cough noted during visit.       Musculoskeletal:   [] Normal gait with no signs of ataxia         [x] Normal range of motion of neck        [] Abnormal-       Neurological:        [x] No Facial Asymmetry (Cranial nerve 7 motor function) (limited exam to video visit)          [] No gaze palsy        [] Abnormal-         Skin:        [] No significant exanthematous lesions or discoloration noted on facial skin         [] Abnormal-            Psychiatric:       [x] Normal Affect [] No Hallucinations        [] Abnormal-     Other pertinent observable physical exam findings-     ASSESSMENT/PLAN:  1. Cough: with post nasal drainage and sinus pain. Sinusitis v COVID-19.  - COVID testing pending. Isolation precautions advised. - Day 3 of symptoms, if no improvement by day 6-7, patient to call  - Flonase, saline spray and mucinex  - Supportive measures advised  - fluticasone (FLONASE) 50 MCG/ACT nasal spray; 1 spray by Each Nostril route daily  Dispense: 16 g; Refill: 2  - guaiFENesin (MUCINEX) 600 MG extended release tablet; Take 1 tablet by mouth 2 times daily for 15 days  Dispense: 30 tablet; Refill: 0  - sodium chloride (OCEAN) 0.65 % nasal spray; 1 spray by Nasal route as needed for Congestion (nasal dryness)  Dispense: 1 each; Refill: 3      No follow-ups on file. Sowmya Nuñez, was evaluated through a synchronous (real-time) audio-video encounter. The patient (or guardian if applicable) is aware that this is a billable service. Verbal consent to proceed has been obtained within the past 12 months. The visit was conducted pursuant to the emergency declaration under the [address] [place] [address], [address] [place] [address] authority and the [employer] and [employer] General Act. Patient identification was verified, and a caregiver was present when appropriate. The patient was located in a state where the provider was credentialed to provide care. Total time spent on this encounter: Not billed by time    --CONNIE Palmer on 10/25/2021 at 3:54 PM    An electronic signature was used to authenticate this note.

## 2021-10-25 NOTE — PATIENT INSTRUCTIONS
Flonase, saline nasal spray and mucinex  May take ibuprofen/Tylenol as needed  Drink plenty of water  Isolate from others until we get COVID testing result  Follow up pending your test result

## 2021-10-25 NOTE — TELEPHONE ENCOUNTER
Patient would like to have a prescription to get a COVID 19 test.  She has several of the symptom and needs the order by 12 to have it done.   Please advise

## 2021-10-26 LAB — SARS-COV-2: NOT DETECTED

## 2022-10-14 ENCOUNTER — NURSE TRIAGE (OUTPATIENT)
Dept: OTHER | Facility: CLINIC | Age: 37
End: 2022-10-14

## 2022-10-14 NOTE — TELEPHONE ENCOUNTER
Location of patient: [address] call from Geraldo Witt at [facility] with [employer]. Current Symptoms: dizziness, headache - back of head    Intermittent tightness in back    Diarrhea yesterday x 3 - none today    Speech is clear, speaking in complete sentences    Reports BP as 106/74 HR 88 today at 5:40am      Onset: 2 days ago;     Pain Severity: 4/10;  intermittent    Temperature: denies     What has been tried: salt in water, body armour water, B complex,     Denies - numbness or weakness on one side of the body / fainting / heart palpitations / double vision / loss of vision / shortness of breath /  bloody black or tarry stool    LMP:  \"last week or two weeks ago\"  Pregnant: No    Recommended disposition: See PCP within 24 Hours    Care advice provided, patient verbalizes understanding; denies any other questions or concerns; instructed to call back for any new or worsening symptoms. Patient/Caller agrees with recommended disposition; writer provided warm transfer to Maria M Stock at [facility] for appointment scheduling    Attention Provider: Thank you for allowing me to participate in the care of your patient. The patient was connected to triage in response to information provided to the ECC/PSC. Please do not respond through this encounter as the response is not directed to a shared pool.         Reason for Disposition   [1] MODERATE dizziness (e.g., interferes with normal activities) AND [2] has NOT been evaluated by physician for this  (Exception: dizziness caused by heat exposure, sudden standing, or poor fluid intake)    Protocols used: Dizziness - Lightheadedness-ADULT-

## 2022-10-15 ENCOUNTER — TELEMEDICINE (OUTPATIENT)
Dept: PRIMARY CARE CLINIC | Age: 37
End: 2022-10-15
Payer: COMMERCIAL

## 2022-10-15 DIAGNOSIS — R00.0 TACHYCARDIA: Primary | ICD-10-CM

## 2022-10-15 DIAGNOSIS — R03.1 BLOOD PRESSURE DECREASED: ICD-10-CM

## 2022-10-15 DIAGNOSIS — R00.0 TACHYCARDIA: ICD-10-CM

## 2022-10-15 LAB
A/G RATIO: 1.7 (ref 1.1–2.2)
ALBUMIN SERPL-MCNC: 4.3 G/DL (ref 3.4–5)
ALP BLD-CCNC: 54 U/L (ref 40–129)
ALT SERPL-CCNC: 25 U/L (ref 10–40)
ANION GAP SERPL CALCULATED.3IONS-SCNC: 8 MMOL/L (ref 3–16)
AST SERPL-CCNC: 22 U/L (ref 15–37)
BILIRUB SERPL-MCNC: <0.2 MG/DL (ref 0–1)
BUN BLDV-MCNC: 7 MG/DL (ref 7–20)
CALCIUM SERPL-MCNC: 9.8 MG/DL (ref 8.3–10.6)
CHLORIDE BLD-SCNC: 103 MMOL/L (ref 99–110)
CO2: 28 MMOL/L (ref 21–32)
CREAT SERPL-MCNC: 0.6 MG/DL (ref 0.6–1.1)
GFR AFRICAN AMERICAN: >60
GFR NON-AFRICAN AMERICAN: >60
GLUCOSE BLD-MCNC: 83 MG/DL (ref 70–99)
MAGNESIUM: 2.3 MG/DL (ref 1.8–2.4)
POTASSIUM SERPL-SCNC: 4.9 MMOL/L (ref 3.5–5.1)
SODIUM BLD-SCNC: 139 MMOL/L (ref 136–145)
TOTAL PROTEIN: 6.8 G/DL (ref 6.4–8.2)

## 2022-10-15 PROCEDURE — G8427 DOCREV CUR MEDS BY ELIG CLIN: HCPCS | Performed by: NURSE PRACTITIONER

## 2022-10-15 PROCEDURE — 99213 OFFICE O/P EST LOW 20 MIN: CPT | Performed by: NURSE PRACTITIONER

## 2022-10-15 RX ORDER — MAGNESIUM GLUCONATE 27 MG(500)
250 TABLET ORAL DAILY
COMMUNITY

## 2022-10-15 RX ORDER — VITAMIN B COMPLEX
1 CAPSULE ORAL DAILY
COMMUNITY

## 2022-10-15 ASSESSMENT — ENCOUNTER SYMPTOMS: DIARRHEA: 1

## 2022-10-15 NOTE — PROGRESS NOTES
Meghna Barroso (:  1985) is a Established patient, here for evaluation of the following:    ASSESSMENT/PLAN:  Below is the assessment and plan developed based on review of pertinent history, physical exam, labs, studies, and medications. 1. Tachycardia  -     Comprehensive Metabolic Panel; Future  -     Magnesium; Future  -     EKG 12 Lead  2. Blood pressure decreased  -     [address], Cardiology, [address]  No follow-ups on file. SUBJECTIVE/OBJECTIVE:  This is a 30year old patient of Rosendo Lemus, consenting for VV today. She has had dizziness and headaches. She was concerned. Her blood pressure has been lower than normal and HR increased. She got dizzy and ate something. She had right shoulder and back discomfort. Last night she felt so poorly that she almost went to . She took magnesium and felt better, BP was low. Today she didn't feel heaviness in arms and legs. Today her BP was 97/67. Her heart rate was 105 at 0709. Her heart rate has fluctuated . She could feel heart racing at times. She added salt to her water. She has added mag and b complex, as directed by her PCP. She drinks at least 64 ounces daily. She does not feel her symptoms are positional. She gets headache intermittently. She had random bouts of diarrhea one day. Here are some recorded BP and HR  96/62  82 10/12  101-61 68 10/13 am  113/74 61  10/14 am  130/93  103  10/14  pm  Recommend she drink sugar free power petros with electrolytes and keep water intake up. Will order labs and EKG. Referral to cardiology due to symptoms. Advised to go to [facility] or ED if she has discomfort or episode similar to previous ones. Will notify her of test results. Review of Systems   Cardiovascular:         Chest tightness   Gastrointestinal:  Positive for diarrhea. Neurological:  Positive for dizziness, weakness and light-headedness.      Patient-Reported Vitals 10/15/2022   Patient-Reported Weight 114 Patient-Reported Height 5\" 0'   Patient-Reported Systolic -   Patient-Reported Diastolic -   Patient-Reported Temperature -   Patient-Reported SpO2 -         Physical Exam    [INSTRUCTIONS:  \"[x]\" Indicates a positive item  \"[]\" Indicates a negative item  -- DELETE ALL ITEMS NOT EXAMINED]    Constitutional: [x] Appears well-developed and well-nourished [x] No apparent distress      [] Abnormal -     Mental status: [x] Alert and awake  [x] Oriented to person/place/time [x] Able to follow commands    [] Abnormal -     Eyes:   EOM    [x]  Normal    [] Abnormal -   Sclera  [x]  Normal    [] Abnormal -          Discharge [x]  None visible   [] Abnormal -     HENT: [x] Normocephalic, atraumatic  [] Abnormal -   [x] Mouth/Throat: Mucous membranes are moist    External Ears [x] Normal  [] Abnormal -    Neck: [x] No visualized mass [] Abnormal -     Pulmonary/Chest: [x] Respiratory effort normal   [x] No visualized signs of difficulty breathing or respiratory distress        [] Abnormal -      Musculoskeletal:   [x] Normal gait with no signs of ataxia         [x] Normal range of motion of neck        [] Abnormal -     Neurological:        [x] No Facial Asymmetry (Cranial nerve 7 motor function) (limited exam due to video visit)          [x] No gaze palsy        [] Abnormal -          Skin:        [x] No significant exanthematous lesions or discoloration noted on facial skin         [] Abnormal -            Psychiatric:       [x] Normal Affect [] Abnormal -       Other pertinent observable physical exam findings:-      On this date 10/15/2022 I have spent 25 minutes reviewing previous notes, test results and face to face (virtual) with the patient discussing the diagnosis and importance of compliance with the treatment plan as well as documenting on the day of the visit. Nelida Pavon, was evaluated through a synchronous (real-time) audio-video encounter.  The patient (or guardian if applicable) is aware that this is a billable service, which includes applicable co-pays. This Virtual Visit was conducted with patient's (and/or legal guardian's) consent. The visit was conducted pursuant to the emergency declaration under the [address] [place] [address] authority and the [employer] and [employer] General Act. Patient identification was verified, and a caregiver was present when appropriate. The patient was located at home in a state where the provider was licensed to provide care.     --CONNIE Tapia

## 2022-10-15 NOTE — PROGRESS NOTES
[facility] Locations - No appointment necessary. @ indicates the location is open Saturdays in addition to Monday through Friday. Call your preferred location for test preparation, business hours and other information you need. [employer] accepts [employer]. [facility]    @ [place] [facility] [address] [facility] [address] [place] [address]. [address] [facility], [address]   Ph: [phone] [place] [facility] [address]   Ph: [phone]  @ [facility] [address] [facility]   Ph: [phone]    [facility] [address]   Ph: [phone] @ [place] [facility]. [address]  Ph: [phone] @ [facility] S[facility]. [address] [place] [address]. [address] [place] [address] [address]   Ph: [phone]     [facility] Svcs. [address], [address]  Ph: [phone]    [facility]   @ [place] [facility]. [address] [facility] [address] [place], [place] [address]   Ph: [phone] [facility] [facility], [address] [facility]  Ph: [address] [place] [address]   [facility]:  [address] Lab Svcs[address] [facility]   Ph: [phone] [facility]. Lab Svcs.   [address] [facility], [address] [facility]   Ph: [phone]

## 2022-10-17 ENCOUNTER — HOSPITAL ENCOUNTER (OUTPATIENT)
Age: 37
Discharge: HOME | End: 2022-10-17
Payer: COMMERCIAL

## 2022-10-17 PROCEDURE — 93005 ELECTROCARDIOGRAM TRACING: CPT | Performed by: NURSE PRACTITIONER

## 2022-10-19 LAB
EKG ATRIAL RATE: 66 BPM
EKG DIAGNOSIS: NORMAL
EKG P AXIS: 52 DEGREES
EKG P-R INTERVAL: 176 MS
EKG Q-T INTERVAL: 390 MS
EKG QRS DURATION: 78 MS
EKG QTC CALCULATION (BAZETT): 408 MS
EKG R AXIS: 41 DEGREES
EKG T AXIS: 38 DEGREES
EKG VENTRICULAR RATE: 66 BPM

## 2022-10-19 PROCEDURE — 93010 ELECTROCARDIOGRAM REPORT: CPT | Performed by: INTERNAL MEDICINE

## 2022-10-19 NOTE — PROGRESS NOTES
[address]  10/25/22  Referring:  Filomena ROSALES- CNP    REASON FOR CONSULT/CHIEF COMPLAINT/HPI     Reason for visit/ Chief complaint   Blood pressure decreased    HPI Ashtyn Velazquez is a 30 y. o.female referred by her CNP Gómez for low blood pressure and tachycardia. She c/o dizziness, heart racing  and headaches. She denied her symptoms being positional. Her headaches are intermittent. Her blood pressure has been running low and she has been tachycardic. (97/67 ) Her heart rate fluctuates between . She stated she drinks 64 ounces of fluid daily. Per her PCP's direction she is adding salt to her water and taking mag and b complex. Today she is here with her . 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 She has also been having intermittent chest pain. She drinks a gallon to 1/3 gallon of water a day. She does circuits, she used to be in the [employer] and she is trying to get back to her previous weight. She is very healthy with her diet and working out. She has sporadic chest pain, last Tuesday she had a lot of pressure and she went to the ER. She denies leg swelling, she has numbness and tingling in her legs. She only has SOB while lying down if she has experienced chest pain. Aunt, mom- HTN, grandmother, a lot of heart attacks and strokes run in the family. She is a non smoker, denies being a diabetic. She has been having heavy periods. She had pre eclampsia with her daughter, she has 2 children. She didn't have any heart failure type of symptoms during her 2 pregnancies. Patient is adherent with medications and is tolerating them well without side effects     HISTORY/ALLERGIES/ROS     MedHx:  has no past medical history on file. SurgHx:  has a past surgical history that includes  section. SocHx:  reports that she has never smoked.  She has never used smokeless tobacco. She reports that she does not drink alcohol and does not use drugs. FamHx: Premature CAD and strokes run in the family  Allergies: Penicillins     MEDICATIONS      Prior to Admission medications    Medication Sig Start Date End Date Taking? Authorizing Provider   b complex vitamins capsule Take 1 capsule by mouth daily   Yes Historical Provider, MD   magnesium gluconate (MAGONATE) 500 MG tablet Take 250 mg by mouth daily   Yes Historical Provider, MD   Multiple Vitamin (MULTIVITAMIN PO) Take by mouth   Yes Historical Provider, MD       PHYSICAL EXAM        Vitals:    10/25/22 1035   BP: 100/60   Pulse: (!) 105   SpO2: 97%    Weight: 116 lb 4.8 oz (52.8 kg)     Gen Alert, cooperative, no distress Heart  Regular rate and rhythm, no murmur   Head Normocephalic, atraumatic, no abnormalities Abd  Soft, NT, +BS, no mass, no OM   Eyes PERRLA, conj/corn clear Ext  Ext nl, AT, no C/C, no edema   Nose Nares normal, no drain age, Non-tender Pulse 2+ and symmetric   Throat Lips, mucosa, tongue normal Skin Color/text/turg nl, no rash/lesions   Neck S/S, TM, NT, no bruit Psych Nl mood and affect   Lung CTA-B, unlabored, no DTP     Ch wall NT, no deform       LABS and Imaging     Relevant and available CV data reviewed    EKG personally interpreted: 10/17/22 NSR    2/6/21 Lipid Panel  TG 54 HDL 56 LDL 85  TSH 2021 wnl  CBC 2021 wnl      ModerateRisk  ModerateComplexity/Medical Decision Making    Outside/Care everywhere records Reviewed  Labs Reviewed  Prior Imaging, ekg, cath, echo reviewed when available  Medications reviewed  Old Notes reviewed  ASSESSMENT AND PLAN     Chest pain of uncertain etiology / dyspnea on exertion  Stress echocardiogram  Tachycardia/dizziness/hypotension  7 day Holter monitor  Labwork to evaluate for anemia from heavy periods, thyroid, UA to check spec gravity, CMP    Patient counseled on lifestyle modification, diet, and exercise.     Follow Up: As needed pending results of above    Sara Royal Gerald Benjamin MD  Cardiologist [facility]    Scribe's Attestation: This note was scribed in the presence of Dr. Gerson Kemp MD by Barbara Perez RN. 10/25/22     Physician Attestation  The scribe wrote this note in the presence of me Ashley Sierra MD). The scribe may have prepopulated components of this note with my historical  intellectual property under my direct supervision. Any additions to this intellectual property were performed in my presence and at my direction. Furthermore, the content and accuracy of this note have been reviewed by me with edits by me as needed.   Ashley Sierra MD 10/25/2022 11:25 AM

## 2022-10-20 NOTE — RESULT ENCOUNTER NOTE
Please inform patient her EKG showed normal sinus rhythm. Follow up for in person evaluation with pcp and cardiology as referred.  TY

## 2022-10-24 PROBLEM — R00.0 TACHYCARDIA: Status: ACTIVE | Noted: 2022-10-24

## 2022-10-24 PROBLEM — R03.1: Status: ACTIVE | Noted: 2022-10-24

## 2022-10-25 ENCOUNTER — OFFICE VISIT (OUTPATIENT)
Dept: PRIMARY CARE CLINIC | Age: 37
End: 2022-10-25
Payer: COMMERCIAL

## 2022-10-25 ENCOUNTER — HOSPITAL ENCOUNTER (OUTPATIENT)
Age: 37
Discharge: HOME | End: 2022-10-25
Payer: COMMERCIAL

## 2022-10-25 ENCOUNTER — OFFICE VISIT (OUTPATIENT)
Dept: CARDIOLOGY CLINIC | Age: 37
End: 2022-10-25
Payer: COMMERCIAL

## 2022-10-25 VITALS
TEMPERATURE: 98.4 F | HEIGHT: 61 IN | SYSTOLIC BLOOD PRESSURE: 108 MMHG | OXYGEN SATURATION: 100 % | HEART RATE: 86 BPM | DIASTOLIC BLOOD PRESSURE: 76 MMHG | WEIGHT: 117 LBS | BODY MASS INDEX: 22.09 KG/M2

## 2022-10-25 VITALS
WEIGHT: 116.3 LBS | SYSTOLIC BLOOD PRESSURE: 100 MMHG | HEIGHT: 61 IN | OXYGEN SATURATION: 97 % | HEART RATE: 105 BPM | BODY MASS INDEX: 21.96 KG/M2 | DIASTOLIC BLOOD PRESSURE: 60 MMHG

## 2022-10-25 DIAGNOSIS — R00.0 TACHYCARDIA: ICD-10-CM

## 2022-10-25 DIAGNOSIS — R03.1 DECREASED BLOOD PRESSURE, NOT HYPOTENSION: ICD-10-CM

## 2022-10-25 DIAGNOSIS — R00.2 PALPITATIONS: ICD-10-CM

## 2022-10-25 DIAGNOSIS — R07.9 CHEST PAIN, UNSPECIFIED TYPE: ICD-10-CM

## 2022-10-25 DIAGNOSIS — R07.9 CHEST PAIN OF UNCERTAIN ETIOLOGY: ICD-10-CM

## 2022-10-25 DIAGNOSIS — R00.0 TACHYCARDIA: Primary | ICD-10-CM

## 2022-10-25 DIAGNOSIS — R03.1 DECREASED BLOOD PRESSURE, NOT HYPOTENSION: Primary | ICD-10-CM

## 2022-10-25 LAB
A/G RATIO: 1.9 (ref 1.1–2.2)
ALBUMIN SERPL-MCNC: 4.8 G/DL (ref 3.4–5)
ALP BLD-CCNC: 55 U/L (ref 40–129)
ALT SERPL-CCNC: 22 U/L (ref 10–40)
ANION GAP SERPL CALCULATED.3IONS-SCNC: 12 MMOL/L (ref 3–16)
AST SERPL-CCNC: 19 U/L (ref 15–37)
BACTERIA: NORMAL /HPF
BILIRUB SERPL-MCNC: <0.2 MG/DL (ref 0–1)
BILIRUBIN URINE: NEGATIVE
BLOOD, URINE: NEGATIVE
BUN BLDV-MCNC: 11 MG/DL (ref 7–20)
CALCIUM SERPL-MCNC: 9.9 MG/DL (ref 8.3–10.6)
CHLORIDE BLD-SCNC: 104 MMOL/L (ref 99–110)
CLARITY: CLEAR
CO2: 24 MMOL/L (ref 21–32)
COLOR: YELLOW
CREAT SERPL-MCNC: 0.6 MG/DL (ref 0.6–1.1)
EPITHELIAL CELLS, UA: 4 /HPF (ref 0–5)
GFR SERPL CREATININE-BSD FRML MDRD: >60 ML/MIN/{1.73_M2}
GLUCOSE BLD-MCNC: 70 MG/DL (ref 70–99)
GLUCOSE URINE: NEGATIVE MG/DL
HCT VFR BLD CALC: 40.5 % (ref 36–48)
HEMOGLOBIN: 13.6 G/DL (ref 12–16)
HYALINE CASTS: 0 /LPF (ref 0–8)
KETONES, URINE: NEGATIVE MG/DL
LEUKOCYTE ESTERASE, URINE: ABNORMAL
MCH RBC QN AUTO: 30.3 PG (ref 26–34)
MCHC RBC AUTO-ENTMCNC: 33.5 G/DL (ref 31–36)
MCV RBC AUTO: 90.5 FL (ref 80–100)
MICROSCOPIC EXAMINATION: YES
NITRITE, URINE: NEGATIVE
PDW BLD-RTO: 13.4 % (ref 12.4–15.4)
PH UA: 7.5 (ref 5–8)
PLATELET # BLD: 208 K/UL (ref 135–450)
PMV BLD AUTO: 10.1 FL (ref 5–10.5)
POTASSIUM SERPL-SCNC: 4.5 MMOL/L (ref 3.5–5.1)
PROTEIN UA: NEGATIVE MG/DL
RBC # BLD: 4.48 M/UL (ref 4–5.2)
RBC UA: 1 /HPF (ref 0–4)
SODIUM BLD-SCNC: 140 MMOL/L (ref 136–145)
SPECIFIC GRAVITY UA: 1.01 (ref 1–1.03)
TOTAL PROTEIN: 7.3 G/DL (ref 6.4–8.2)
TSH SERPL DL<=0.05 MIU/L-ACNC: 0.68 UIU/ML (ref 0.27–4.2)
URINE TYPE: ABNORMAL
UROBILINOGEN, URINE: 0.2 E.U./DL
WBC # BLD: 5.8 K/UL (ref 4–11)
WBC UA: 0 /HPF (ref 0–5)

## 2022-10-25 PROCEDURE — G8420 CALC BMI NORM PARAMETERS: HCPCS | Performed by: NURSE PRACTITIONER

## 2022-10-25 PROCEDURE — 84443 ASSAY THYROID STIM HORMONE: CPT

## 2022-10-25 PROCEDURE — 36415 COLL VENOUS BLD VENIPUNCTURE: CPT

## 2022-10-25 PROCEDURE — 81001 URINALYSIS AUTO W/SCOPE: CPT

## 2022-10-25 PROCEDURE — 85027 COMPLETE CBC AUTOMATED: CPT

## 2022-10-25 PROCEDURE — 82746 ASSAY OF FOLIC ACID SERUM: CPT

## 2022-10-25 PROCEDURE — G8484 FLU IMMUNIZE NO ADMIN: HCPCS | Performed by: INTERNAL MEDICINE

## 2022-10-25 PROCEDURE — 80053 COMPREHEN METABOLIC PANEL: CPT

## 2022-10-25 PROCEDURE — 99213 OFFICE O/P EST LOW 20 MIN: CPT | Performed by: NURSE PRACTITIONER

## 2022-10-25 PROCEDURE — 82607 VITAMIN B-12: CPT

## 2022-10-25 PROCEDURE — G8427 DOCREV CUR MEDS BY ELIG CLIN: HCPCS | Performed by: NURSE PRACTITIONER

## 2022-10-25 PROCEDURE — 1036F TOBACCO NON-USER: CPT | Performed by: NURSE PRACTITIONER

## 2022-10-25 PROCEDURE — G8420 CALC BMI NORM PARAMETERS: HCPCS | Performed by: INTERNAL MEDICINE

## 2022-10-25 PROCEDURE — 81003 URINALYSIS AUTO W/O SCOPE: CPT | Performed by: INTERNAL MEDICINE

## 2022-10-25 PROCEDURE — G8484 FLU IMMUNIZE NO ADMIN: HCPCS | Performed by: NURSE PRACTITIONER

## 2022-10-25 PROCEDURE — G8427 DOCREV CUR MEDS BY ELIG CLIN: HCPCS | Performed by: INTERNAL MEDICINE

## 2022-10-25 PROCEDURE — 93246 EXT ECG>7D<15D RECORDING: CPT | Performed by: INTERNAL MEDICINE

## 2022-10-25 PROCEDURE — 99204 OFFICE O/P NEW MOD 45 MIN: CPT | Performed by: INTERNAL MEDICINE

## 2022-10-25 SDOH — ECONOMIC STABILITY: FOOD INSECURITY: WITHIN THE PAST 12 MONTHS, THE FOOD YOU BOUGHT JUST DIDN'T LAST AND YOU DIDN'T HAVE MONEY TO GET MORE.: NEVER TRUE

## 2022-10-25 SDOH — ECONOMIC STABILITY: FOOD INSECURITY: WITHIN THE PAST 12 MONTHS, YOU WORRIED THAT YOUR FOOD WOULD RUN OUT BEFORE YOU GOT MONEY TO BUY MORE.: NEVER TRUE

## 2022-10-25 ASSESSMENT — PATIENT HEALTH QUESTIONNAIRE - PHQ9
SUM OF ALL RESPONSES TO PHQ QUESTIONS 1-9: 0
1. LITTLE INTEREST OR PLEASURE IN DOING THINGS: 0
2. FEELING DOWN, DEPRESSED OR HOPELESS: 0
SUM OF ALL RESPONSES TO PHQ QUESTIONS 1-9: 0
SUM OF ALL RESPONSES TO PHQ9 QUESTIONS 1 & 2: 0

## 2022-10-25 ASSESSMENT — ENCOUNTER SYMPTOMS
CONSTIPATION: 0
NAUSEA: 0
RECTAL PAIN: 0
ABDOMINAL PAIN: 0
VOMITING: 0
DIARRHEA: 0
CHEST TIGHTNESS: 0
SHORTNESS OF BREATH: 0

## 2022-10-25 ASSESSMENT — SOCIAL DETERMINANTS OF HEALTH (SDOH): HOW HARD IS IT FOR YOU TO PAY FOR THE VERY BASICS LIKE FOOD, HOUSING, MEDICAL CARE, AND HEATING?: NOT VERY HARD

## 2022-10-25 NOTE — PATIENT INSTRUCTIONS
Schedule stress echo  7 Day Holter Monitor- we will call you with results   Labs: CBC, TSH, CMP, Urine analysis   Follow up as needed

## 2022-10-25 NOTE — PROGRESS NOTES
Laurie Hernandez (:  1985) is a 30 y.o. female,Established patient, here for evaluation of the following chief complaint(s):  Follow-up (Discuss outside appointments)         ASSESSMENT/PLAN:  1. Tachycardia- wearing 7 day holter monitor, upcoming echo stress test  Comments:  -followed by Cardiology  -will complete upcoming FMLA paperwork and fax  Orders:  -     Vitamin B12 & Folate; Future  2. Decreased blood pressure, not hypotension- wearing 7 day holter monitor; upcoming  echo stress test  Comments:  -followed by Cardiology  -will complete FMLA paperwork and fax  Orders:  -     Vitamin B12 & Folate; Future    No follow-ups on file. Subjective   SUBJECTIVE/OBJECTIVE:  HPI  Seen by Cardiology today for Tachycardia and chest pain. Seen at [address] [place] [address] emergency department for the same,  holter monitor placed while in ED, placed incorrectly. Had new monitor placed today by Cardiology, for 7 days. Seen last year, discussed starting b-complex and magnesium, decided to start them on Saturday or . EKG revealed NSR. Reports headaches, dizziness, fluttering in chest and chest pain, occurs frequently. Tingling in limbs, legs feel heavy occurs infrequently. She is requesting FMLA paperwork to have completed. She is to have stress test completed in the future. Review of Systems   Constitutional:  Negative for activity change and fever. HENT:  Negative for congestion. Eyes:  Negative for visual disturbance. Respiratory:  Negative for chest tightness and shortness of breath. Cardiovascular:  Negative for chest pain, palpitations and leg swelling. Gastrointestinal:  Negative for abdominal pain, constipation, diarrhea, nausea, rectal pain and vomiting. Endocrine: Negative for polyuria. Genitourinary:  Negative for dysuria, flank pain, frequency, vaginal bleeding and vaginal discharge. Musculoskeletal:  Negative for arthralgias and myalgias. Skin:  Negative for rash. Neurological:  Negative for dizziness, light-headedness and headaches. Psychiatric/Behavioral:  Negative for agitation, decreased concentration and sleep disturbance. The patient is not nervous/anxious. Objective    height is 5' 1\" (1.549 m) and weight is 117 lb (53.1 kg). Her temperature is 98.4 °F (36.9 °C). Her blood pressure is 108/76 and her pulse is 86. Her oxygen saturation is 100%. BP Readings from Last 3 Encounters:   10/25/22 108/76   10/25/22 100/60   21 109/75      Wt Readings from Last 3 Encounters:   10/25/22 117 lb (53.1 kg)   10/25/22 116 lb 4.8 oz (52.8 kg)   21 120 lb (54.4 kg)    No past medical history on file. Past Surgical History:   Procedure Laterality Date     SECTION        Family History   Problem Relation Age of Onset    Breast Cancer Mother     High Blood Pressure Mother     Other Father         sleep apnea    Schizophrenia Father     Diabetes Father     Other Sister         compromised immune system    Asthma Sister     Heart Attack Maternal Grandmother     Cancer Maternal Grandfather         colon    Cancer Paternal Grandmother         Lung    No Known Problems Paternal Grandfather     Cancer Maternal Uncle         bone    Cancer Maternal Uncle     Cancer Maternal Uncle     Cancer Paternal Aunt         lung cancer, smoker      Social History     Tobacco Use    Smoking status: Never    Smokeless tobacco: Never   Vaping Use    Vaping Use: Never used   Substance Use Topics    Alcohol use: Never    Drug use: Never      Outpatient Encounter Medications as of 10/25/2022   Medication Sig Dispense Refill    b complex vitamins capsule Take 1 capsule by mouth daily      magnesium gluconate (MAGONATE) 500 MG tablet Take 250 mg by mouth daily      Multiple Vitamin (MULTIVITAMIN PO) Take by mouth       No facility-administered encounter medications on file as of 10/25/2022. Physical Exam  Vitals reviewed.    Constitutional:       Appearance: She is well-developed. HENT:      Head: Normocephalic. Right Ear: Hearing and external ear normal.      Left Ear: Hearing and external ear normal.      Nose: Nose normal.   Eyes:      General: Lids are normal.   Cardiovascular:      Rate and Rhythm: Normal rate and regular rhythm. Heart sounds: Normal heart sounds, S1 normal and S2 normal.   Pulmonary:      Effort: Pulmonary effort is normal.      Breath sounds: Normal breath sounds. Musculoskeletal:         General: Normal range of motion. Skin:     General: Skin is warm and dry. Findings: No rash. Neurological:      Mental Status: She is alert and oriented to person, place, and time. GCS: GCS eye subscore is 4. GCS verbal subscore is 5. GCS motor subscore is 6. Psychiatric:         Speech: Speech normal.         Behavior: Behavior normal. Behavior is cooperative. On this date 10/25/2022 I have spent 15 minutes reviewing previous notes, test results and face to face with the patient discussing the diagnosis and importance of compliance with the treatment plan as well as documenting on the day of the visit. An electronic signature was used to authenticate this note.     --CONNIE Holt - CNP

## 2022-10-26 ENCOUNTER — TELEPHONE (OUTPATIENT)
Dept: CARDIOLOGY CLINIC | Age: 37
End: 2022-10-26

## 2022-10-26 LAB
FOLATE: >20 NG/ML (ref 4.78–24.2)
VITAMIN B-12: 765 PG/ML (ref 211–911)

## 2022-10-26 NOTE — TELEPHONE ENCOUNTER
----- Message from Sergio Harper MD sent at 10/26/2022  8:17 AM EDT -----  Let her know all her labs are totally normal

## 2022-10-26 NOTE — TELEPHONE ENCOUNTER
Called patient and informed her of [address] message that labs are normal. Pt verbalized understanding. Will call if she has any questions or concerns.

## 2022-10-27 ENCOUNTER — PATIENT MESSAGE (OUTPATIENT)
Dept: CARDIOLOGY CLINIC | Age: 37
End: 2022-10-27

## 2022-10-28 NOTE — TELEPHONE ENCOUNTER
From: Lan Galvan  To: Dr. Carissa Ruvalcaba  Sent: 10/27/2022 5:05 PM EDT  Subject: Heart rate     Hi,   I'm checking to see parameters for deciding when to call the office or go to the ER. Today I had chest pain and my heart rate was 201. My stress test is scheduled for Monday. Any guidance is appreciated.     Thank you,   Lan Galvan

## 2022-10-31 ENCOUNTER — PROCEDURE VISIT (OUTPATIENT)
Dept: CARDIOLOGY CLINIC | Age: 37
End: 2022-10-31
Payer: COMMERCIAL

## 2022-10-31 DIAGNOSIS — R07.9 CHEST PAIN, UNSPECIFIED TYPE: ICD-10-CM

## 2022-10-31 LAB
LV EF: 50 %
LVEF MODALITY: NORMAL

## 2022-10-31 PROCEDURE — 93351 STRESS TTE COMPLETE: CPT | Performed by: INTERNAL MEDICINE

## 2022-10-31 PROCEDURE — 93320 DOPPLER ECHO COMPLETE: CPT | Performed by: INTERNAL MEDICINE

## 2022-10-31 PROCEDURE — 93325 DOPPLER ECHO COLOR FLOW MAPG: CPT | Performed by: INTERNAL MEDICINE

## 2022-11-01 ENCOUNTER — TELEPHONE (OUTPATIENT)
Dept: PRIMARY CARE CLINIC | Age: 37
End: 2022-11-01

## 2022-11-01 NOTE — TELEPHONE ENCOUNTER
Patient states she wants to know what is her next step because Cardiology told her that it was not heart disease. She is talking to someone from [facility] benefits and they are not aware of what additional information is needed.   They would like an end date for the [facility]

## 2022-11-01 NOTE — TELEPHONE ENCOUNTER
Patient called in stating that the insurance company told patient that she needed additional  information for FMLA papers.     Please advise  [phone]

## 2022-11-03 ENCOUNTER — PATIENT MESSAGE (OUTPATIENT)
Dept: CARDIOLOGY CLINIC | Age: 37
End: 2022-11-03

## 2022-11-03 RX ORDER — CYCLOBENZAPRINE HCL 5 MG
5 TABLET ORAL 2 TIMES DAILY PRN
Qty: 60 TABLET | Refills: 1 | Status: SHIPPED | OUTPATIENT
Start: 2022-11-03 | End: 2022-11-13

## 2022-11-03 RX ORDER — METHYLPREDNISOLONE 4 MG/1
TABLET ORAL
Qty: 1 KIT | Refills: 0 | Status: SHIPPED | OUTPATIENT
Start: 2022-11-03 | End: 2022-11-09

## 2022-11-04 PROCEDURE — 93248 EXT ECG>7D<15D REV&INTERPJ: CPT | Performed by: INTERNAL MEDICINE

## 2022-11-04 NOTE — TELEPHONE ENCOUNTER
Patient informed It may be anxiety, can give you referral information for therapist.  She states she spent 8 years in the [employer] and does not believe it is anxiety and wants to wait until the results of the Holter Monitor to come back before making a decision.

## 2022-11-08 NOTE — TELEPHONE ENCOUNTER
From: Maritza Gutierrez  To: Dr. Sergio Harper  Sent: 11/3/2022 12:34 PM EDT  Subject: Holter Monitor    Hello,    I'm reaching out to get an idea on when I should expect to get the results from my Holter monitor. I understand that the device has to be shipped but I don't recall being given an idea on wait time other than when it's received the office would call me for a follow appt.

## 2022-11-10 ENCOUNTER — TELEPHONE (OUTPATIENT)
Dept: CARDIOLOGY CLINIC | Age: 37
End: 2022-11-10

## 2022-11-10 DIAGNOSIS — R00.2 PALPITATIONS: Primary | ICD-10-CM

## 2022-11-10 DIAGNOSIS — R00.0 TACHYCARDIA: ICD-10-CM

## 2022-11-10 NOTE — TELEPHONE ENCOUNTER
----- Message from Cathi Zayas MD sent at 11/9/2022 11:42 AM EST -----  Regarding: RE: Holter Monitor  Sure, we can get you a 30 day event monitor, that's no problem    Gabby Sims, can you make it happen? Thanks    ----- Message -----  From: Emory De La Cruz  Sent: 11/9/2022   9:14 AM EST  To: Cathi Zayas MD  Subject: Holter Monitor                                   ----- Message from Emory De La Cruz sent at 11/9/2022  9:14 AM EST -----  Called pt about the message below and to make her an appointment no opening till December and patient feels she needs seen sooner. Please advise  Thank you     ----- Message from Elba to Cathi Zayas MD sent at 11/9/2022  9:08 AM -----   I would like to schedule an appt as I do still have concerns and questions.       ----- Message -----       From:SAMI aVn       Sent:11/9/2022  9:07 AM EST         To:Agustina Calderon    Subject:Holter Monitor    Estelalo Sriram,  Please see the message below from Dr. Avery Smith     I just got the report back just now and it's normal   No need for a f/u appt unless new symptoms or concerns      If you have any question or concerns don't hesitate to call our office at [phone]    Thank you,   Carilyn Fleischer, CALEB       ----- Message -----       Luz Maria Calderon       Sent:11/3/2022 12:34 PM EDT         To:Dr. Cathi Zayas    Subject:Holter Monitor    Hello,    I'm reaching out to get an idea on when I should expect to get the results from my Holter monitor. I understand that the device has to be shipped but I don't recall being given an idea on wait time other than when it's received the office would call me for a follow appt.

## 2022-11-10 NOTE — TELEPHONE ENCOUNTER
I spoke with Mrs Domingo Weller and she was in agreement to wear the 30 day holter monitor and then come in to see Dr Eli Silverio. She has an appointment to place monitor 11/11 @ 10 am and an appointment to go over results with Dr Eli Silverio on 12/16@ 830. She denied further questions or concerns.

## 2022-11-11 ENCOUNTER — NURSE ONLY (OUTPATIENT)
Dept: CARDIOLOGY CLINIC | Age: 37
End: 2022-11-11

## 2022-11-28 ENCOUNTER — TELEPHONE (OUTPATIENT)
Dept: CARDIOLOGY CLINIC | Age: 37
End: 2022-11-28

## 2022-11-28 NOTE — TELEPHONE ENCOUNTER
Urgent [facility] received on patient. 11/28/22 at 0634 sinus tach w/ . She was driving at 8am and felt SOB with lightheadedness. Per Dr Atiya Briscoe, see EP for ? Inappropriate sinus tach. I scheduled Ms Miller Vance for 11/29/22 with RMM. She was agreeable to this appt date/time.

## 2022-11-29 ENCOUNTER — OFFICE VISIT (OUTPATIENT)
Dept: CARDIOLOGY CLINIC | Age: 37
End: 2022-11-29
Payer: COMMERCIAL

## 2022-11-29 VITALS
SYSTOLIC BLOOD PRESSURE: 126 MMHG | HEART RATE: 70 BPM | DIASTOLIC BLOOD PRESSURE: 78 MMHG | HEIGHT: 60 IN | WEIGHT: 119.8 LBS | OXYGEN SATURATION: 98 % | BODY MASS INDEX: 23.52 KG/M2

## 2022-11-29 DIAGNOSIS — R00.0 TACHYCARDIA: Primary | ICD-10-CM

## 2022-11-29 PROCEDURE — 1036F TOBACCO NON-USER: CPT | Performed by: INTERNAL MEDICINE

## 2022-11-29 PROCEDURE — G8427 DOCREV CUR MEDS BY ELIG CLIN: HCPCS | Performed by: INTERNAL MEDICINE

## 2022-11-29 PROCEDURE — G8420 CALC BMI NORM PARAMETERS: HCPCS | Performed by: INTERNAL MEDICINE

## 2022-11-29 PROCEDURE — 99204 OFFICE O/P NEW MOD 45 MIN: CPT | Performed by: INTERNAL MEDICINE

## 2022-11-29 PROCEDURE — G8484 FLU IMMUNIZE NO ADMIN: HCPCS | Performed by: INTERNAL MEDICINE

## 2022-11-29 NOTE — PROGRESS NOTES
rate increased to 180 bpm.    Stress echocardiography was normal.  TSH on 10/25/2022 was 0.68 within normal range    Discussed sinus tachycardia. Differential diagnosis include atrial tachycardia. However available tracings are all suggestive of sinus tachycardia    We discussed EP study if conditions continues to differentiate between AT versus sinus tachycardia. She is not interested and declined. We also discussed pharmacologic therapy with beta-blocker or calcium channel blockers if her symptoms continues. She declines and is not interested in taking medication. Monitors her heart rate and rhythm on her smart watch    Denies recent illness or infection   Normal Stress echo 10/31/2022   TSH 0.68 on 10/25/2022    She would like to get second opinion from [facility]. Clinical information's are available through [employer] and [employer] everywhere and she will receive copies of her tracings and Holter monitoring. Patient Active Problem List    Diagnosis Date Noted    Decreased blood pressure, not hypotension 10/24/2022    Tachycardia 10/24/2022    Aching headache 09/28/2021     Diagnostic studies:   ECG 11/29/22  Refused    Stress/echo 10/31/2022  -Excellent exercise tolerance.  -Normal stress echocardiogram.    I independently reviewed the cardiac diagnostic studies, ECG and relevant imaging studies. Lab Results   Component Value Date    LVEF 50 10/31/2022     Lab Results   Component Value Date    TSH 0.68 10/25/2022       Physical Examination:  Vitals:    11/29/22 0914   BP: 126/78   Pulse: 70   SpO2: 98%      Wt Readings from Last 3 Encounters:   11/29/22 119 lb 12.8 oz (54.3 kg)   10/25/22 117 lb (53.1 kg)   10/25/22 116 lb 4.8 oz (52.8 kg)       Constitutional: Oriented. No distress. Head: Normocephalic and atraumatic. Mouth/Throat: Oropharynx is clear and moist.   Eyes: Conjunctivae normal. EOM are normal.   Neck: Neck supple. No JVD present.     Cardiovascular: Normal rate, regular rhythm, S1&S2. Pulmonary/Chest: Bilateral respiratory sounds. No rhonchi. Abdominal: Soft. No tenderness. Musculoskeletal: No tenderness. No edema    Lymphadenopathy: Has no cervical adenopathy. Neurological: Alert and oriented. Follows command, No Gross deficit   Skin: Skin is warm, No rash noted. Psychiatric: Has a normal behavior       Review of System:  [x] Full ROS obtained and negative except as mentioned in HPI    Prior to Admission medications    Medication Sig Start Date End Date Taking? Authorizing Provider   Multiple Vitamin (MULTIVITAMIN PO) Take by mouth   Yes Historical Provider, MD       History reviewed. No pertinent past medical history. Past Surgical History:   Procedure Laterality Date     SECTION         Allergies   Allergen Reactions    Penicillins        Social History:  Reviewed. reports that she has never smoked. She has never used smokeless tobacco. She reports that she does not drink alcohol and does not use drugs. Family History:  Reviewed. Reviewed. No family history of SCD. Relevant and available labs, and cardiovascular diagnostics reviewed. Reviewed. I independently reviewed all cardiac tracing. I independently reviewed relevant and available cardiac diagnostic tests ECG, CXR, Echo, Stress test, Device interrogation, Holter, CT scan. - The patient is counseled to get regular exercise 3-5 times per week to control cardiovascular risk factors. All questions and concerns were addressed to the patient/family. Alternatives to my treatment were discussed. I have discussed the above stated plan and the patient verbalized understanding and agreed with the plan. Scribe attestation: This note was scribed in the presence of Gemini Greenberg MD by Adrian Limon RN  Physician Attestation: I, Dr. Gemini Greenberg, confirm that the scribe's documentation has been prepared under my direction and personally reviewed by me in its entirety.   I also confirm that the note above accurately reflects all work, treatment, procedures, and medical decision making performed by me. NOTE: This report was transcribed using voice recognition software. Every effort was made to ensure accuracy, however, inadvertent computerized transcription errors may be present.      Marie Goyal MD, MPH  [facility]   Office: [phone]  Fax: [phone]

## 2022-11-29 NOTE — PATIENT INSTRUCTIONS
-You should see your primary care physician regarding the memory issues and numbness in your extremities.

## 2022-11-30 ENCOUNTER — TELEPHONE (OUTPATIENT)
Dept: CARDIOLOGY CLINIC | Age: 37
End: 2022-11-30

## 2022-11-30 DIAGNOSIS — R00.0 TACHYCARDIA: Primary | ICD-10-CM

## 2022-11-30 NOTE — TELEPHONE ENCOUNTER
Pt called to request the referral to the [facility], Dr. Hilda Galeazzi, fax #: [phone], on the cover letter please include:    Name of Pt  Medical Records Number: [id number] (Per Pt Please write out the word: Medical Record Number [id number])    Per Pt please put in her MyChart when the referral has been sent.   Please advise

## 2022-12-01 ENCOUNTER — PATIENT MESSAGE (OUTPATIENT)
Dept: CARDIOLOGY CLINIC | Age: 37
End: 2022-12-01

## 2022-12-02 ENCOUNTER — PATIENT MESSAGE (OUTPATIENT)
Dept: PRIMARY CARE CLINIC | Age: 37
End: 2022-12-02

## 2022-12-02 NOTE — TELEPHONE ENCOUNTER
From: Grace Carranza  To: Dr. Jefferson Stanley: 12/2/2022 6:29 AM EST  Subject: FMLA in Dr. Jj Sheikh absence    [facility],    I'm writing to see if you could update my FMLA paperwork. I am having more trouble than before and when I have to walk up the stairs, or walk to the bathroom I'm getting more light headed, dizzy and my heart rate is still elevating. I have an appt in Jan to see a specialist at [facility] so that I can get a care plan. However, I have projects to manage so I don't want t I be off work like the current [employer] plan states. I want to work remotely which will allow me to do my work without the hardship of all the stairs as there are no elevators in our company because its an old building.

## 2022-12-02 NOTE — TELEPHONE ENCOUNTER
From: Dominique King  To: Dr. Walter Harms: 12/1/2022 4:32 AM EST  Subject: Requested Test    Dr. Lucy Fernando,    During my visit you offered to run test after my visit to [facility]. Can you run a test before my visit to [facility]? Can you order a tilt test to be done on me?      Thank you,   Dominique King

## 2022-12-05 NOTE — TELEPHONE ENCOUNTER
Please place letter for tilt table test. Pt would like to schedule this as soon as possible. Pt also asking if RMM would fill out FMLA paper work to work from home. Please call pt to advise.

## 2022-12-09 ENCOUNTER — HOSPITAL ENCOUNTER (OUTPATIENT)
Dept: CARDIAC CATH/INVASIVE PROCEDURES | Age: 37
Discharge: HOME | End: 2022-12-09
Attending: INTERNAL MEDICINE | Admitting: INTERNAL MEDICINE
Payer: COMMERCIAL

## 2022-12-09 PROBLEM — R42 DIZZINESS: Status: ACTIVE | Noted: 2022-12-09

## 2022-12-09 PROCEDURE — 93660 TILT TABLE EVALUATION: CPT | Performed by: INTERNAL MEDICINE

## 2022-12-09 PROCEDURE — 93660 TILT TABLE EVALUATION: CPT

## 2022-12-09 RX ORDER — SODIUM CHLORIDE 0.9 % (FLUSH) 0.9 %
5-40 SYRINGE (ML) INJECTION PRN
Status: DISCONTINUED | OUTPATIENT
Start: 2022-12-09 | End: 2022-12-09 | Stop reason: HOSPADM

## 2022-12-09 NOTE — PROCEDURES
[facility]     Electrophysiology Procedure Note       Date of Procedure: 12/9/2022  Patient's Name: Hernando Arechiga  YOB: 1985   Medical Record Number: [id number]  Procedure Performed by: Lane Haro    Procedures performed:  Tilt table testing and administration of sublingual nitroglycerin     Indication of the procedure:  Hernando Arechiga is a 30 y.o. female initially referred to electrophysiology clinic for tachycardia, chest discomfort and dizziness. Symptoms were recurrent, daily, impacting quality of life. Holter monitor demonstrated likely sinus tachycardia. Referred for tilt table testing to discern orthostatic hypotension, POTS. Patient is seeking second opinion at [facility] following study. Details of procedure:    Procedure's risks, benefits and alternatives of procedure were explained to patient. All questions were answered. Patient understood and informed consent was obtained. The patient was brought to the electrophysiology lab in a fasting nonsedated state. Peripheral IV access was obtained and he was put on the tilt table test.    Initial vital signs at baseline:  BP: 125/81 HR: 59-68 RR: 12 O2sat: 100%    Then the tilt table was raised to 70 degree. Immediately upon raising the table the vitals were:   BP: 128/90 HR: 77     After 10 minutes: BP: 112/78    HR: 80-88     After 15 minutes, patient received 0.4 mg NTG sublingual. Patient obtained a maximal heart rate of 160 bpm with increase in blood pressure. At 5 minutes post NTG  and /77. Patient didn't experience syncope during the tilt table test.     At the end of procedure:  BP: 108/71 HR: 70 RR: 18 O2sat: 96%    The patient tolerated the procedure well and there were no complications. Assessment:  Normal tilt table test. No orthostasis and less than 30 bpm sustained increase in heart rate with 70 degree tilt at 10 minutes.     Plan:  [facility] [employer] [facility], referral has been previously placed  Follow up in electrophysiology clinic with Dr Kirk Cook MD  [facility]   Office: [phone]  Fax: [phone]

## 2022-12-09 NOTE — H&P
[facility]     Electrophysiology Procedure Note       Date of Procedure: 2022  Patient's Name: Renee Vines  YOB: 1985   Medical Record Number: [id number]    Indication: Tilt Table testing     Consent: I have discussed with the patient and/or the patient representative the indication, alternatives, and the possible risks and/or complications of the planned procedure and the anesthesia methods. The patient and/or patient representative appear to understand and agree to proceed. No past medical history on file. Past Surgical History:   Procedure Laterality Date     SECTION         Prior to Admission medications    Medication Sig Start Date End Date Taking? Authorizing Provider   Multiple Vitamin (MULTIVITAMIN PO) Take by mouth    Historical Provider, MD       Pre-Sedation Documentation and Exam:   Consult note reviewed. No changes.       NAD  Non labored  Heart regular rate and rhythm, no LE edema  Abd soft   Moves all extremities, normal tone  No focal neuro deficits  Appropriate mood and affect     Sedation/ Anesthesia Plan:   none     Maria Luz River MD  [facility]   Office: [phone]  Fax: [phone]

## 2022-12-14 NOTE — TELEPHONE ENCOUNTER
Called pt and relayed message per [facility] RN with verbal understanding from pt and number giver for her to call.

## 2022-12-22 ENCOUNTER — TELEPHONE (OUTPATIENT)
Dept: PRIMARY CARE CLINIC | Age: 37
End: 2022-12-22

## 2022-12-22 NOTE — TELEPHONE ENCOUNTER
Patient called stated that per conversation with her on Monday 12/19/22; stated that she sent her FMLA paperwork to you by e-mail & patient would like to know if any provider filled out FMLA paperwork and can you email information back needed asap, at [email or web address]